# Patient Record
(demographics unavailable — no encounter records)

---

## 2025-01-07 NOTE — DISCUSSION/SUMMARY
[FreeTextEntry1] : Patient was advised to improve her diet. Continue lipitor at present dosage. Her lipid levels have improved on her recent lab test results. Patient was instructed to target their T. Cholesterol to less than 200 mg/dl and LDL cholesterol to less than 100 mg/dl. Start a low fat, low cholesterol diet. EKG: NSR ,rate of 67 bpm inc. RBBB Treadmill stress test. and 2D echo doppler  Patient was advised on her repeat  BMP, CBC , fasting lipid profile and hepatic panel in 6 months. RV in 6 months. [EKG obtained to assist in diagnosis and management of assessed problem(s)] : EKG obtained to assist in diagnosis and management of assessed problem(s)

## 2025-01-07 NOTE — HISTORY OF PRESENT ILLNESS
[FreeTextEntry1] : Hyperlipidemia\par  Patient denies a history of MI, angina, CHF,arrhythmia, TIA, CVA, syncope, DM, Familial history of heart disease, syncope.\par  Patient denies a smoking history. She denies any hospitalizations or prior surgery. She did not undergo any prior cardiac testing.

## 2025-01-07 NOTE — ASSESSMENT
[FreeTextEntry1] : Exertional dyspnea Recent URI incomplete RBBB- stable and unchanged Hyperlipidemia stable

## 2025-01-07 NOTE — REASON FOR VISIT
[FreeTextEntry1] : 47 year old Pleasant  female presents for follow up and to review her lab test results. She is recovering from an URI. She reports some exertional dyspnea.

## 2025-01-07 NOTE — REVIEW OF SYSTEMS
[Feeling Fatigued] : feeling fatigued [Dyspnea on exertion] : dyspnea during exertion [Negative] : Heme/Lymph [Lower Ext Edema] : no extremity edema [Leg Claudication] : no intermittent leg claudication [Palpitations] : no palpitations [Orthopnea] : no orthopnea [PND] : no PND [Syncope] : no syncope

## 2025-02-04 NOTE — PHYSICAL EXAM
[No Acute Distress] : no acute distress [Normal Oropharynx] : normal oropharynx [Normal Appearance] : normal appearance [Normal Rate/Rhythm] : normal rate/rhythm [Normal S1, S2] : normal s1, s2 [No Murmurs] : no murmurs [No Abnormalities] : no abnormalities [Benign] : benign [No Edema] : no edema [TextBox_68] : Inspiratory and expiratory wheezing

## 2025-02-04 NOTE — HISTORY OF PRESENT ILLNESS
[Never] : never [TextBox_4] : 52 y/o F with pmhx of HLD presnting for SOB and wheezing. Tj november pt has been having a persistent wheezing and SOB worse on exertion. Works at [employer] where she may be exposed to some dust which is worsening her symptoms. No hx of asthma, no smoking, no known allergies. Improves with albuterol. Went to the ED recently for an asthma exacerbation.

## 2025-02-04 NOTE — END OF VISIT
[] : Fellow [FreeTextEntry3] : Severe asthma exacerbation Spirometry today not adequate however FEV1 is markedly reduced Wheezing on both inspiration and expiration Most recent chest x-ray normal Check CBC with differential, IgE, asthma profile Prednisone taper over 10 days Start Symbicort 160 twice daily and continue albuterol as needed Nebulizer machine and albuterol nebulizer ordered Appropriate precautions at work particularly with the mask Cannot perform strenuous activities for the next 2 weeks Advised to postpone her stress test as well 2-week follow-up

## 2025-02-04 NOTE — REVIEW OF SYSTEMS
[Fatigue] : fatigue [Chest Tightness] : chest tightness [Dyspnea] : dyspnea [Wheezing] : wheezing [SOB on Exertion] : sob on exertion [Negative] : Neurologic

## 2025-02-04 NOTE — ASSESSMENT
[FreeTextEntry1] : Asthma exacerbation, unknown trigger Severe persistent asthma  - Recent CXR reviewed - Actively wheezing and is SOB - Patient educated on needing to go the ED if breathing gets worse - Sprio in office showing severe reduction in FEV1 - Steroids for 10 days total, prednisone 40mg qd for 5 days, prednsione for 20mg qd for 5 days - Symbicort 160mcg 2puffs BID - Albuterol as needed, will order nebulizer as well - PFTs outpatient - CBC w/ diff, asthma profile, IgE total - RTC in 1 month

## 2025-02-25 NOTE — REVIEW OF SYSTEMS
Patient assessment complete. A/ox4. No complaints of nausea or headache. Pt ambulated to toilet, tolerated well. Lung sounds clear. VSS. Drink provided. No needs at this time. [Fatigue] : fatigue [Chest Tightness] : chest tightness [Dyspnea] : dyspnea [Wheezing] : wheezing [SOB on Exertion] : sob on exertion [Negative] : Neurologic

## 2025-02-25 NOTE — ASSESSMENT
[FreeTextEntry1] : At least moderate persistent asthma PFTs normal Did not tolerate Symbicort that well Switch to Advair 500 twice daily Add Spiriva 1.25 daily Continue as needed albuterol Peripheral eosinophilia noted with eosinophil count of 700 IgE and asthma profile were normal Advised to wear a mask and avoid dust exposure at work I explained to her that we will follow-up in 1 month and assess how her symptoms are doing If there is no marked improvement then she may qualify for Biologics 1 month follow-up

## 2025-02-25 NOTE — HISTORY OF PRESENT ILLNESS
[Never] : never [TextBox_4] : 50 y/o F with pmhx of HLD presnting for SOB and wheezing. Sinve november pt has been having a persistent wheezing and SOB worse on exertion. Works at [employer] where she may be exposed to some dust which is worsening her symptoms. No hx of asthma, no smoking, no known allergies. Improves with albuterol. Went to the ED recently for an asthma exacerbation.   2/25/2025: She did not tolerate the Symbicort well and is currently using albuterol couple of times a day.  PFTs were done and were normal.  CBC showed marked eosinophilia.  She continues to have wheezing.  She works at [employer] and is exposed to dust.  Asthma profile was negative.

## 2025-04-07 NOTE — PLAN
[FreeTextEntry1] : pt has MRI of b/l breasts   #1 health maintenance  #2 breast pain diag mammo/sono

## 2025-04-07 NOTE — HISTORY OF PRESENT ILLNESS
[FreeTextEntry1] : 50 y/o female for annul pt c/o left breast pain x months previous right cyst removed  no masses, no discharge from nipple, no weight loss [Mammogramdate] : 24 [PapSmeardate] : 24 [ColonoscopyDate] : 23

## 2025-04-29 NOTE — PHYSICAL EXAM
[Normocephalic] : normocephalic [Atraumatic] : atraumatic [EOMI] : extra ocular movement intact [Examined in the supine and seated position] : examined in the supine and seated position [Symmetrical] : symmetrical [No dominant masses] : no dominant masses in right breast  [No dominant masses] : no dominant masses left breast [No Nipple Retraction] : no left nipple retraction [No Nipple Discharge] : no left nipple discharge [No Axillary Lymphadenopathy] : no left axillary lymphadenopathy [No Edema] : no edema [No Rashes] : no rashes [No Ulceration] : no ulceration [de-identified] : On physical exam, there are no discrete masses in either breast or axilla. There is no nipple discharge or inversion bilaterally. There are no skin changes bilaterally.

## 2025-04-29 NOTE — DATA REVIEWED
[FreeTextEntry1] : 08     EXAM:  MG US BREAST COMPLETE BI   ORDERED BY: KENISHA OSPINA  ACC: [id number]     EXAM:  MG MAMMO DIAG W ELBA BI#   ORDERED BY: KENISHA OSPINA  PROCEDURE DATE:  04/15/2025    INTERPRETATION:  Clinical history: Pain superior left breast.  The patient reports last clinical breast examination was performed about: Within the past year.  Family history of breast cancer: Sister at age 45.  Comparisons: Mammograms dating back to 2021.  Views obtained: bilateral full field digital 2D and digital tomosynthesis images as well as spot views.  Computer-aided detection was utilized in the interpretation of this examination.  Breast composition: The breasts are extremely dense, which lowers the sensitivity of mammography.  Findings:  Mammogram:  No suspicious mass, microcalcifications or areas of architectural distortion seen in either breast including area of pain in superior left breast.  Ultrasound:  Bilateral whole breast ultrasound was performed.  There is no solid or cystic mass noted in either breast. No right or left axillary lymphadenopathy.  Impression: No mammographic or sonographic evidence of malignancy.  Recommendation: clinical correlation is advised.  BI-RADS Category 1: Negative

## 2025-04-29 NOTE — DATA REVIEWED
[FreeTextEntry1] : 08     EXAM:  MG US BREAST COMPLETE BI   ORDERED BY: KENISHA OSPINA  ACC: [id number]     EXAM:  MG MAMMO DIAG W ELBA BI#   ORDERED BY: KENISHA OSPINA  PROCEDURE DATE:  04/15/2025    INTERPRETATION:  Clinical history: Pain superior left breast.  The patient reports last clinical breast examination was performed about: Within the past year.  Family history of breast cancer: Sister at age 45.  Comparisons: Mammograms dating back to 2021.  Views obtained: bilateral full field digital 2D and digital tomosynthesis images as well as spot views.  Computer-aided detection was utilized in the interpretation of this examination.  Breast composition: The breasts are extremely dense, which lowers the sensitivity of mammography.  Findings:  Mammogram:  No suspicious mass, microcalcifications or areas of architectural distortion seen in either breast including area of pain in superior left breast.  Ultrasound:  Bilateral whole breast ultrasound was performed.  There is no solid or cystic mass noted in either breast. No right or left axillary lymphadenopathy.  Impression: No mammographic or sonographic evidence of malignancy.  Recommendation: clinical correlation is advised.  BI-RADS Category 1: Negative

## 2025-04-29 NOTE — ASSESSMENT
[FreeTextEntry1] : Patient is a 51F with right breast radial scar s/p lumpectomy on 9/4/24: radial scar and small FA. Patient is a high risk patient for developing breast cancer due to her family hx   She underwent bilateral scg mmg and subsequent right mmg in 5/2024 which showed UOQ calcifications biopsied as a radial scar (tophat) (concordant).  She is s/p lumpectomy on 9/4/24: radial scar and small FA.  We discussed her pathology in detail. We discussed that there was no upgrade to carcinoma.  04/15/2025 b/l dx mammo and US -->birads1 breasts are extremely dense No mammographic or sonographic evidence of malignancy.  We discussed dense breasts. Women who have dense breast tissue have a higher risk of breast cancer compared to women with less dense breast tissue. It is unclear at this time why dense breast tissue is linked to breast cancer risk. One can consider bilateral screening ultrasound for patients with heterogeneously to extremely dense breasts. However, out of 1000 women screened, the use of routine ultrasound will only identify an additional 3-5 cancers. The use of ultrasound was found to increase the likelihood of undergoing more imaging and more biopsies. She does have dense breasts. We have decided to proceed with screening bilateral breast ultrasound at this time. This will be scheduled with her next screening mammogram.   We discussed breast pain. The patient was reassured that the majority of breast pain is not secondary to malignancy. Her pain may be related to fibrocystic changes within her breast that are hormonally influenced. We spoke about possible interventions, including evening primrose oil, supportive bras, and decreasing caffeine intake as needed for symptomatic relief. Although none of these have been consistently proven to improve breast pain, they may be tried. At this time, she will try supportive measures. She was advised to return should she have any new findings or persistent concerns.   TC LR = 28% This puts her in the high risk category for breast cancer because she has a lifetime risk of >20%.  She qualifies for annual screening MRIs which would be done in an alternating fashion with her screening mammograms such that an imaging study and clinical breast exam would be performed every 6 months.  The use of MRIs have not been shown to prolong survival, however out of 1000 women screened, an additional 14-15 cancers will be identified.  The use of MRIs, has, however, been shown to increase the number of procedures and additional imaging because although it is a very sensitive test, it is not as specific.  This was discussed with the patient and she would like to proceed with screening MRIs.  This will be scheduled for 10/2025  PASH (pseudoangiomatous stromal hyperplasia) is a benign overgrowth of the stromal cells of the breast that can present as a palpable mass and simulates a vascular lesion.  There is no indication for surgical excision as this is currently asymptomatic and benign.  AS review: We again discussed radial scars. I explained a fibroadenoma to the patient. I explained that they are benign lesions that do not have malignant potential. They are usually hormonally influenced and therefore may increase in size over time. Patients with these lesions may have a slightly increased relative risk of breast cancer compared to the reference population.  All questions and concerns were answered in detail.   PLAN: -MRI in October 2025 -bilateral mmg/us4/16/26  She is for follow up after imaging, pending any interval changes.  Total time spent on encounter was greater than 30 minutes , which included face to face time with the patient, performing an exam, reviewing previous medical records, reviewing current imaging/ pathology, documenting in patient record and coordinating care/imaging. Greater than 50% of the encounter was spent on counseling and coordination of her breast issue.

## 2025-04-29 NOTE — PHYSICAL EXAM
[Normocephalic] : normocephalic [Atraumatic] : atraumatic [EOMI] : extra ocular movement intact [Examined in the supine and seated position] : examined in the supine and seated position [Symmetrical] : symmetrical [No dominant masses] : no dominant masses in right breast  [No dominant masses] : no dominant masses left breast [No Nipple Retraction] : no left nipple retraction [No Nipple Discharge] : no left nipple discharge [No Axillary Lymphadenopathy] : no left axillary lymphadenopathy [No Edema] : no edema [No Rashes] : no rashes [No Ulceration] : no ulceration [de-identified] : On physical exam, there are no discrete masses in either breast or axilla. There is no nipple discharge or inversion bilaterally. There are no skin changes bilaterally.

## 2025-04-29 NOTE — HISTORY OF PRESENT ILLNESS
[FreeTextEntry1] : MONALISA ROSA is a 50-year-old female patient with right breast radial scar s/p lumpectomy on 9/4/24: radial scar, small FA  FHx: 2 sisters with breast cancer in their 40s TC LR = 28%  Genetics : positive for NBN  Her work-up is as follows: B/L Screening Mammo - 05/11/2024: -The breasts are extremely dense, which lowers the sensitivity of mammography. -There are calcifications seen in the lateral right breast. -No suspicious mass, grouping of calcifications, or other abnormality is identified in the left breast. BI-RADS Category 0:  Incomplete: Needs Additional Imaging Evaluation  Right Uni Dx Mammo - 05/20/2024: -There are grouped heterogeneous calcifications noted in the upper outer quadrant of the right breast at area of mammographic concern.  Stereotactic biopsy recommended. BI-RADS Category 4: Suspicious  Stereo Guided Core Bx - 06/03/2024: Right, UOQ Ca++: (top-hat) - Radial sclerosing lesion (radial scar), 3.0 mm in this biopsy material (microscopic measurement). - Proliferative type fibrocystic changes including florid duct hyperplasia, stromal fibrosis, sclerosing adenosis, columnar cell change without atypia, and both calcium oxalate crystals and phosphate microcalcifications. This is benign, high risk, concordant histology. Surgical management recommended.  9/4/24: R lumpectomy - Radial sclerosing lesion (radial scar) located adjacent to prior biopsy site changes. - Tiny hyalinized fibroadenoma, cystic/papillary apocrine metaplasia, and focal pseudo-lactational change. - Proliferative type fibrocystic changes including usual duct hyperplasia (UDH), stromal fibrosis with foci of pseudoangiomatous stromal hyperplasia (PASH), adenosis, and phosphate microcalcifications present in small ductules.  04/15/2025 b/l dx mammo and US -->birads1 breasts are extremely dense No mammographic or sonographic evidence of malignancy.  She states she has chronic cyclical breast pain but denies any other breast related symptoms

## 2025-05-27 NOTE — PROCEDURE
[FreeTextEntry1] : Chest PA and Lateral View  Reason:   asthma exacerbation   Findings:  The retro-cardial and retero-sternal spaces are clear.  Both the lung fields are equally translucent.  The posterior and lateral costo-phrenic angles are clear.  No hilar or mediastinal mass is seen.  Domes of diaphragm are normal in position and contour.  The cardiac outline is normal.  No obvious skeletal abnormality is seen.  Impression:  Pneumomediastinum noted; ER eval

## 2025-05-27 NOTE — HISTORY OF PRESENT ILLNESS
[Never] : never [TextBox_4] : 50 y/o F with pmhx of HLD presnting for SOB and wheezing. Tj november pt has been having a persistent wheezing and SOB worse on exertion. Works at [employer] where she may be exposed to some dust which is worsening her symptoms. No hx of asthma, no smoking, no known allergies. Improves with albuterol. Went to the ED recently for an asthma exacerbation.   5/19/2025: She is on Wixela twice a day, Spiriva, and albuterol as needed.  She was doing well up until recently where she had worsening of her symptoms.  She is today wheezing on exam.  She is short of breath.  Asthma not controlled.

## 2025-05-27 NOTE — ASSESSMENT
[FreeTextEntry1] : Severe persistent asthma Continue Wixela 500 twice daily and Spiriva 1.25 daily Continue as needed albuterol Currently uncontrolled in exacerbation Peripheral eosinophilia noted with eosinophil count of 700 IgE and asthma profile were normal She has had 2 exacerbations thus far, her symptoms are not controlled I will order Dupixent for her today Chest x-ray done today with evidence of pneumopericardium/pneumomediastinum While the treatment is conservative I recommend emergency room evaluation  [place] papers filled as well today The patient cannot per form strenuous activities; cannot work in extreme temperatures; cannot be exposed to dust, fumes or any irritants to the lungs, can walk flat ground but is unable to go up hill or steps; cannot lift more than 15 lbs, it would be advisable to switch her to a different role in the company.  The end date is when asthma is controlled and this will be assessed on a month to month basis.  This note can be attached to the [facility] request if patient gives permission.

## 2025-05-27 NOTE — ASSESSMENT
[FreeTextEntry1] : Severe persistent asthma Continue Wixela 500 twice daily and Spiriva 1.25 daily Continue as needed albuterol Currently uncontrolled in exacerbation Peripheral eosinophilia noted with eosinophil count of 700 IgE and asthma profile were normal She has had 2 exacerbations thus far, her symptoms are not controlled I will order Dupixent for her today Chest x-ray done today with evidence of pneumopericardium/pneumomediastinum While the treatment is conservative I recommend emergency room evaluation  LA papers filled as well today The patient cannot per form strenuous activities; cannot work in extreme temperatures; cannot be exposed to dust, fumes or any irritants to the lungs, can walk flat ground but is unable to go up hill or steps; cannot lift more than 15 lbs, it would be advisable to switch her to a different role in the company.  The end date is when asthma is controlled and this will be assessed on a month to month basis.  This note can be attached to the [facility] request if patient gives permission.

## 2025-06-26 NOTE — ASSESSMENT
[FreeTextEntry1] : Normal endoscopy and imaging Ear pain likely myofascial pain/temporomandibular joint disorder (TMJ-related pain) Discussed use of warm compresses and soft diet and antiinflammatories Follow-up with dentistry if no relief

## 2025-06-26 NOTE — HISTORY OF PRESENT ILLNESS
[de-identified] : 51-year-old patient presents for initial evaluation for:  throat discomfort - sensation of something in her throat. Symptoms started in November of last year, at that time she went to ER and was diagnosed with asthma. Feels like something is stuck in throat. Denies trouble swallowing. No cough or phlegm in throat.  C/o of external ear pain that is sharp, comes and goes. Symptoms occurred twice this month.

## 2025-06-26 NOTE — PROCEDURE
[Complicated Symptoms] : complicated symptoms requiring more thorough examination than provided by mirror [None] : none [Flexible Endoscope] : examined with the flexible endoscope [de-identified] : Flexible laryngoscopy performed. Nasal cavity, nasopharynx, oropharynx, hypopharynx, and larynx evaluated. No masses or lesions, bilateral true vocal folds symmetric and mobile.

## 2025-06-26 NOTE — DATA REVIEWED
[de-identified] : CT chest 5/2025 personally reviewed and interpreted demonstrating no evidence of head and neck aerodigestive tract mass, thyroid mass, tracheal stenosis